# Patient Record
Sex: FEMALE | URBAN - METROPOLITAN AREA
[De-identification: names, ages, dates, MRNs, and addresses within clinical notes are randomized per-mention and may not be internally consistent; named-entity substitution may affect disease eponyms.]

---

## 2021-10-08 ENCOUNTER — EMERGENCY (EMERGENCY)
Facility: HOSPITAL | Age: 41
LOS: 1 days | Discharge: LEFT BEFORE DISCHARGE | End: 2021-10-08
Admitting: EMERGENCY MEDICINE
Payer: SELF-PAY

## 2021-10-08 VITALS
TEMPERATURE: 99 F | HEART RATE: 109 BPM | DIASTOLIC BLOOD PRESSURE: 69 MMHG | RESPIRATION RATE: 16 BRPM | SYSTOLIC BLOOD PRESSURE: 126 MMHG | OXYGEN SATURATION: 100 %

## 2021-10-08 PROCEDURE — L9991: CPT

## 2021-10-08 NOTE — ED ADULT TRIAGE NOTE - CHIEF COMPLAINT QUOTE
Pt c/o vaginal bleeding x6 months, abd cramping, lethargic, and dizziness x3 days.  Hx type 2 DM.  +SOB.  Denies CP